# Patient Record
Sex: MALE | Race: WHITE
[De-identification: names, ages, dates, MRNs, and addresses within clinical notes are randomized per-mention and may not be internally consistent; named-entity substitution may affect disease eponyms.]

---

## 2020-08-18 ENCOUNTER — TRANSCRIBE ORDERS (OUTPATIENT)
Dept: OTHER | Age: 70
End: 2020-08-18

## 2020-08-18 ENCOUNTER — PATIENT OUTREACH (OUTPATIENT)
Dept: GASTROENTEROLOGY | Facility: CLINIC | Age: 70
End: 2020-08-18

## 2020-08-18 DIAGNOSIS — R59.1 LYMPHADENOPATHY: Primary | ICD-10-CM

## 2020-08-18 DIAGNOSIS — R59.0 LOCALIZED ENLARGED LYMPH NODES: Primary | ICD-10-CM

## 2020-08-18 NOTE — PROGRESS NOTES
Care Coordination New Patient Referral  [facility]    NP referral date: 08/18/20  Referred to MD: [facility]    Referring:  Niall Conley NP; Pulmonary medicine Tramaine Griffith  Referring contact information see initial referral note - not yet received  Referral for EUS    Diagnosis: mediastinal lymphadenopathy  Lung mass - noted on PET    Talked with patient and discussed plan for EUS.     Imaging:     PET - report sent via email report - images are available.    Referral reviewed by Dr. King  [facility] for [facility].     08/19/20 - reviewed preop instructions including NPO solids 8 hours prior, clear liquids up to 2 hours prior,  He is not on blood thinner, no diabetes.  [facility] will go to [facility] and get ride from there with his wife.    Provided with my call back information.        Anamaria Stallings  BSN, HNBC  RN Care Coordinator  [facility]  Ph: [phone]  Email: [email or web address]

## 2020-08-20 ENCOUNTER — PATIENT OUTREACH (OUTPATIENT)
Dept: GASTROENTEROLOGY | Facility: CLINIC | Age: 70
End: 2020-08-20

## 2020-08-20 ENCOUNTER — PREP FOR PROCEDURE (OUTPATIENT)
Dept: GASTROENTEROLOGY | Facility: CLINIC | Age: 70
End: 2020-08-20

## 2020-08-20 DIAGNOSIS — R59.1 LYMPHADENOPATHY: Primary | ICD-10-CM

## 2020-08-21 ENCOUNTER — HOSPITAL ENCOUNTER (OUTPATIENT)
Facility: CLINIC | Age: 70
End: 2020-08-21
Attending: INTERNAL MEDICINE | Admitting: INTERNAL MEDICINE

## 2020-08-21 DIAGNOSIS — Z11.59 ENCOUNTER FOR SCREENING FOR OTHER VIRAL DISEASES: Primary | ICD-10-CM

## 2020-08-21 DIAGNOSIS — R59.1 LYMPHADENOPATHY: ICD-10-CM

## 2020-08-24 RX ORDER — LIDOCAINE 40 MG/G
CREAM TOPICAL
Status: CANCELLED | OUTPATIENT
Start: 2020-08-24

## 2020-08-25 ENCOUNTER — PATIENT OUTREACH (OUTPATIENT)
Dept: GASTROENTEROLOGY | Facility: CLINIC | Age: 70
End: 2020-08-25

## 2020-08-25 NOTE — PROGRESS NOTES
"Care Coordination Telephone Call  [facility]     Called patient to again review need for EUS procedure.  OR  talked with him this am and he has many questions.  He states to me that he has no way to get to the [facility] for the procedure.  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  They are aware of his concerns and at this time have asked that we cancel referral and they will re consult if needed.    Anamaria BECKN, HNBC, STAR-T  RN Care Coordinator  [facility]  Ph: [phone]  FAX: [phone]          "